# Patient Record
Sex: MALE | Race: WHITE | Employment: STUDENT | URBAN - METROPOLITAN AREA
[De-identification: names, ages, dates, MRNs, and addresses within clinical notes are randomized per-mention and may not be internally consistent; named-entity substitution may affect disease eponyms.]

---

## 2017-07-19 ENCOUNTER — APPOINTMENT (OUTPATIENT)
Dept: LAB | Age: 22
End: 2017-07-19
Attending: PHYSICIAN ASSISTANT
Payer: COMMERCIAL

## 2017-07-19 DIAGNOSIS — Z72.51 UNPROTECTED SEXUAL INTERCOURSE: ICD-10-CM

## 2017-07-19 LAB — T PALLIDUM AB SER QL IA: NONREACTIVE

## 2017-07-19 PROCEDURE — 87591 N.GONORRHOEAE DNA AMP PROB: CPT

## 2017-07-19 PROCEDURE — 36415 COLL VENOUS BLD VENIPUNCTURE: CPT

## 2017-07-19 PROCEDURE — 86780 TREPONEMA PALLIDUM: CPT

## 2017-07-19 PROCEDURE — 87491 CHLMYD TRACH DNA AMP PROBE: CPT

## 2017-07-19 PROCEDURE — 87389 HIV-1 AG W/HIV-1&-2 AB AG IA: CPT

## 2017-07-19 NOTE — PROGRESS NOTES
Pili Rae is a 24year old male. HPI:   Dawood Vences presents for ADD/ADHD. He states his medication is controlling his concentration well. No insomnia, weight loss, tics, or aggression.  He has been exposed to strep throat via his GF; he has no symptoms, bu Skin: No visible rashes  Psych: Normal mood, affect, memory, judgement, and thought process    ASSESSMENT AND PLAN:   Attention deficit hyperactivity disorder (adhd), predominantly inattentive type  (primary encounter diagnosis)  Encounter for medication m · Lack of discipline   What is ADHD? Attention deficit hyperactivity disorder makes it hard to focus your mind. You may daydream a lot. And you may be restless much of the time. As a result, you may have trouble with detailed or boring work.  And it may be

## 2017-07-20 LAB
C TRACH DNA SPEC QL NAA+PROBE: NEGATIVE
N GONORRHOEA DNA SPEC QL NAA+PROBE: NEGATIVE

## 2017-10-30 PROBLEM — F41.9 ANXIETY: Status: ACTIVE | Noted: 2017-10-30

## 2017-10-30 PROBLEM — G47.00 INSOMNIA: Status: ACTIVE | Noted: 2017-10-30

## 2017-10-30 NOTE — PATIENT INSTRUCTIONS
Insomnia  Insomnia is repeated difficulty going to sleep or staying asleep, or both. Whether you have insomnia is not defined by a specific amount of sleep.  Different people need different amounts of sleep, and you may need more or less sleep at [place] [id number] Many different medidcines can affect your sleep, such as stimulants, caffeine, alcohol, some decongestants, and diet pills.  Other medicines may include some types of blood pressure pills, steroids, asthma medicines, antihistamines, antidepressants, seizure · Get regular exercise. Find other ways to lessen your stress level. · If a medicine was prescribed to help reset your sleep patterns, take it as directed. Sleeping pills are intended for short-term use, only.  If taken for too long, the effect wears off w Almost everyone gets nervous now and then. It’s normal to have knots in your stomach before a test, or for your heart to race on a first date. But an anxiety disorder is much more than a case of nerves. In fact, its symptoms may be overwhelming.  But treatm You may believe that nothing can help you. Or, you might fear what others may think. But most anxiety symptoms can be eased. Having an anxiety disorder is nothing to be ashamed of. Most people do best with treatment that combines medicine and therapy.  Thes

## 2017-10-30 NOTE — PROGRESS NOTES
Pablo Melvin is a 24year old male. HPI:   Frances Parada presents for anxiety and insomnia. The anxiety started a few months ago due to the pressure of school, and it is causing him to not sleep well.  He feels tired, but then will lay in bed and cannot rest his Abdomen: No distention  Extremities: No pedal edema  Neuro: A&O x 3; normal gait and balance  Skin: No visible rashes  Psych: Normal mood, affect, memory, judgement, and thought process    ASSESSMENT AND PLAN:   Anxiety  (primary encounter diagnosis)  Inso “Other insomnia” is the third type of insomnia-related sleep disorders.  This description applies to people who have problems getting to sleep or staying asleep, but do not meet all of the factors that describe either short-term or chronic insomnia.    Man · Caffeine, smoking and alcohol also affect sleep. Limit your daily use and do not use these before bedtime. Alcohol may make you sleepy at first, but as its effects wear off, you may awaken a few hours later and have trouble returning to sleep.   · Do not · Trouble walking or talking, loss of balance, numbness or weakness in one side of your body, facial droop  When to seek medical advice  Call your healthcare provider right away if any of these occur:  · Extreme restlessness or irritability  · Confusion or · Panic disorder. This causes an intense fear of being in danger. · Phobias. These are extreme fears of certain objects, places, or events. · Obsessive-compulsive disorder. This causes you to have unwanted thoughts and urges.  You also may perform certain

## 2017-11-28 NOTE — TELEPHONE ENCOUNTER
LOV 10/30/17  Received refill request from [facility] for Sertraline 50mg. Kevin Roman has an appointment with Kimmie Beltran tomorrow 11/29/17 at 2:00p.m. This medication is not on protocol. Will you refill this medication?   Routed to Kimmie Beltran

## 2017-11-29 NOTE — PROGRESS NOTES
Filomena Del Rio is a 24year old male. HPI:   Orland Ormond presents for anxiety. He states his medication is controlling his anxiety well without significant SEs. No panic attacks. He is also here to discuss his insomnia.  He is sleeping a little better with the s balance  Skin: No visible rashes  Psych: Normal mood, affect, memory, judgement, and thought process    ASSESSMENT AND PLAN:   Anxiety  (primary encounter diagnosis)  Primary insomnia  I changed him from trazodone to low-dose Xanax at night, only on nights

## 2018-01-18 NOTE — PROGRESS NOTES
Latesha Burgos is a 25year old male. HPI:   Merry Clemens presents for ADD/ADHD. He states his medication is controlling his concentration well. No insomnia, weight loss, tics, or aggression.      He would like to increase his sertraline from 50 mg, as he does no PLAN:   Anxiety  (primary encounter diagnosis)  Primary insomnia  Attention deficit disorder (add) without hyperactivity  Encounter for medication management  I discussed the increase in sertraline; he did not want to go to 100 mg, so he elected to combine

## 2018-03-12 NOTE — PROGRESS NOTES
CC:  Patient presents with:  Medication Follow-Up      HPI: Sarahy Vu presents for an increase in his ADD medication. He does not feel it has been effective at the current dose, and would like to increase it.  He is happy with the sertraline at 75 mg, but his pha Plan:    (F41.9) Anxiety  (primary encounter diagnosis)  Plan: I refilled 90 days of both sertraline tablets, to be taken together.    (F98.8) Attention deficit disorder (ADD) without hyperactivity  Plan: I will increase his Adderall to 20 mg BID once he b

## 2018-04-19 NOTE — PROGRESS NOTES
Anish Liz is a 25year old male. HPI:   Adama Quinn presents for ADD/ADHD-related issues. He states his medication is controlling his concentration well. No insomnia, weight loss, tics, or aggression.  He has been sleeping well with only occasional difficul process    ASSESSMENT AND PLAN:   (F90.0) Attention deficit hyperactivity disorder (ADHD), predominantly inattentive type  (primary encounter diagnosis)  Plan: Well-controlled on current medication. .    (F41.9) Anxiety  Plan: Well-controlled on current med

## 2018-06-07 ENCOUNTER — HOSPITAL ENCOUNTER (EMERGENCY)
Facility: HOSPITAL | Age: 23
Discharge: LEFT BEFORE DISCHARGE | End: 2018-06-07
Attending: EMERGENCY MEDICINE
Payer: COMMERCIAL

## 2018-06-07 VITALS
DIASTOLIC BLOOD PRESSURE: 100 MMHG | RESPIRATION RATE: 20 BRPM | HEART RATE: 76 BPM | SYSTOLIC BLOOD PRESSURE: 156 MMHG | TEMPERATURE: 98 F

## 2018-06-27 NOTE — TELEPHONE ENCOUNTER
LOV 4/19/2018 was with [facility] and at that time, pt was advised to follow up in 3 months. No future appointments.      Refill request for:      Pending Prescriptions Disp Refills    SERTRALINE HCL 25 MG Oral Tab [Pharmacy Med Name: SERTRALINE 25MG T

## 2018-06-29 RX ORDER — SERTRALINE HYDROCHLORIDE 25 MG/1
TABLET, FILM COATED ORAL
Qty: 90 TABLET | Refills: 0 | Status: SHIPPED | OUTPATIENT
Start: 2018-06-29 | End: 2018-07-20

## 2018-06-29 NOTE — TELEPHONE ENCOUNTER
30 days given. Patient declines scheduling appt at this time. He states he will call back. Pt advised appt is needed prior to further refills. He verbalized understanding.

## 2018-07-20 RX ORDER — SERTRALINE HYDROCHLORIDE 25 MG/1
TABLET, FILM COATED ORAL
Qty: 90 TABLET | Refills: 0 | Status: SHIPPED | OUTPATIENT
Start: 2018-07-20 | End: 2018-08-03

## 2018-07-20 NOTE — TELEPHONE ENCOUNTER
Patient has appt 8/3 but is almost out of Sertraline.   Mom was asking if a month could be sent to [facility] [phone]

## 2018-07-20 NOTE — TELEPHONE ENCOUNTER
Routed to Dr. Chuckie Kaplan - patient has been seeing Linda Whitmore PA-C.  Has appt with you on 8/3/2018    Medication pended for 30 day supply

## 2018-08-03 NOTE — PROGRESS NOTES
Lonny Benjamin is a 25year old male. HPI:   Patient is treated with sertraline x 6 months, started primarily for anxiety.   Feels 'lazy', loss of pleasure in things, last of focus and interest.  Also has ADD and treated with Adderall.    zoloft started

## 2018-08-08 ENCOUNTER — TELEPHONE (OUTPATIENT)
Dept: FAMILY MEDICINE CLINIC | Facility: CLINIC | Age: 23
End: 2018-08-08

## 2018-08-08 NOTE — TELEPHONE ENCOUNTER
Patient's mom called stated patient was seen 8/3 by Dr. Talita Avalos and forgot to ask for adderall refills. Patient needs script.

## 2018-08-08 NOTE — TELEPHONE ENCOUNTER
During last office visit patient stated he did not need Adderall refills but mother called stating he needs refills I will ask Dr Talita Avalos if she will order these and print hard copies to be picked up

## 2018-08-09 RX ORDER — DEXTROAMPHETAMINE SACCHARATE, AMPHETAMINE ASPARTATE, DEXTROAMPHETAMINE SULFATE AND AMPHETAMINE SULFATE 5; 5; 5; 5 MG/1; MG/1; MG/1; MG/1
20 TABLET ORAL 2 TIMES DAILY
Qty: 60 TABLET | Refills: 0 | Status: SHIPPED | OUTPATIENT
Start: 2018-08-09 | End: 2018-09-08 | Stop reason: WASHOUT

## 2018-08-09 RX ORDER — DEXTROAMPHETAMINE SACCHARATE, AMPHETAMINE ASPARTATE, DEXTROAMPHETAMINE SULFATE AND AMPHETAMINE SULFATE 5; 5; 5; 5 MG/1; MG/1; MG/1; MG/1
20 TABLET ORAL 2 TIMES DAILY
Qty: 60 TABLET | Refills: 0 | Status: SHIPPED | OUTPATIENT
Start: 2018-10-08 | End: 2018-11-07 | Stop reason: WASHOUT

## 2018-08-09 RX ORDER — DEXTROAMPHETAMINE SACCHARATE, AMPHETAMINE ASPARTATE, DEXTROAMPHETAMINE SULFATE AND AMPHETAMINE SULFATE 5; 5; 5; 5 MG/1; MG/1; MG/1; MG/1
20 TABLET ORAL 2 TIMES DAILY
Qty: 60 TABLET | Refills: 0 | Status: SHIPPED | OUTPATIENT
Start: 2018-09-08 | End: 2018-10-08 | Stop reason: WASHOUT

## 2018-09-13 ENCOUNTER — OFFICE VISIT (OUTPATIENT)
Dept: FAMILY MEDICINE CLINIC | Facility: CLINIC | Age: 23
End: 2018-09-13
Payer: COMMERCIAL

## 2018-09-13 VITALS
DIASTOLIC BLOOD PRESSURE: 88 MMHG | WEIGHT: 177 LBS | SYSTOLIC BLOOD PRESSURE: 138 MMHG | TEMPERATURE: 99 F | RESPIRATION RATE: 16 BRPM | HEIGHT: 70 IN | BODY MASS INDEX: 25.34 KG/M2 | HEART RATE: 72 BPM

## 2018-09-13 DIAGNOSIS — R00.2 PALPITATIONS: ICD-10-CM

## 2018-09-13 DIAGNOSIS — R01.1 HEART MURMUR, SYSTOLIC: ICD-10-CM

## 2018-09-13 DIAGNOSIS — Z00.00 WELL ADULT EXAM: Primary | ICD-10-CM

## 2018-09-13 PROBLEM — I44.0 AV BLOCK, 1ST DEGREE: Status: ACTIVE | Noted: 2018-09-13

## 2018-09-13 PROCEDURE — 93000 ELECTROCARDIOGRAM COMPLETE: CPT | Performed by: FAMILY MEDICINE

## 2018-09-13 PROCEDURE — 99395 PREV VISIT EST AGE 18-39: CPT | Performed by: FAMILY MEDICINE

## 2018-09-13 NOTE — PROGRESS NOTES
:HPI:   Manuel Florez is a 25year old male who presents for a complete physical exam.     Patient here for physical for track and field. At [employer]. 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  Feels good running.   No c pain  NEURO: denies headaches  PSYCHE: anxiety, ADD  HEMATOLOGIC: denies hx of anemia  ENDOCRINE: denies thyroid history  ALL/ASTHMA: denies hx of allergy or asthma    EXAM:   /88   Pulse 72   Temp 98.6 °F (37 °C) (Oral)   Resp 16   Ht 70\"   Wt 177 Hoang       Thyroid  (most recent labs)   No results found for: T4F      Lipids  (most recent labs)   No results found for: A1C       ASSESSMENT:     Well adult exam  Systolic Murmur  History of 1st degree AV block    PLAN:     Mandy Gaming was seen today for spo

## 2018-11-06 NOTE — TELEPHONE ENCOUNTER
Refill request sent from pharmacy for Escitalopram. LOV 09/13/18. Will you approve refill? Routed to  Evan Barajas.

## 2018-11-07 NOTE — TELEPHONE ENCOUNTER
Dr. Doroteo Catalan had recently started him on Lexapro. Please see how he is doing. I just saw him for sports physical and had not been on for very long. Also, I ordered echo and needs to have this done.

## 2018-11-08 NOTE — TELEPHONE ENCOUNTER
Mom states he is doing well on Lexapro. She will have patient call to schedule echo.     Routed to [employer], PAAnnieC - please refill Lexapro to [facility]

## 2018-11-09 RX ORDER — ESCITALOPRAM OXALATE 10 MG/1
TABLET ORAL
Qty: 30 TABLET | Refills: 0 | Status: SHIPPED | OUTPATIENT
Start: 2018-11-09 | End: 2018-12-03

## 2018-12-03 NOTE — PROGRESS NOTES
Pablo Ngozi is a 25year old male. S:  Patient presents today with the following concerns:  · Taking 10 mg of escitalopram.  Doesn't know if helping as well as sertraline. Off school right now so not concerned about this as has very little stress.   John Mayes dysuria  MUSCULOSKELETAL: denies back pain  NEURO: denies headaches    EXAM:  /80   Pulse 80   Resp 14   Ht 69.5\"   Wt 183 lb   BMI 26.64 kg/m²   GENERAL: well developed, well nourished,in no apparent distress.   Mood, affect, and behavior are normal

## 2018-12-13 ENCOUNTER — HOSPITAL ENCOUNTER (OUTPATIENT)
Dept: CV DIAGNOSTICS | Facility: HOSPITAL | Age: 23
Discharge: HOME | End: 2018-12-13
Attending: FAMILY MEDICINE
Payer: COMMERCIAL

## 2018-12-13 DIAGNOSIS — R01.1 HEART MURMUR, SYSTOLIC: ICD-10-CM

## 2018-12-13 PROCEDURE — 93306 TTE W/DOPPLER COMPLETE: CPT | Performed by: FAMILY MEDICINE

## 2019-01-02 NOTE — PROGRESS NOTES
DSM-IV Diagnostic Criteria for Attention-Deficit/Hyperactivity Disorder   A. Either 1 or 2:              []  1.  Six (or more) of the following symptoms of inattention have persisted for at least six months to a degree that is maladaptive and inconsistent w activities quietly          []  e. Is often “on the go” or often acts as if “driven by a motor”          []  f. Often talks excessively          []  Impulsivity          []  g. Often blurts out answers before questions have been completed          [x]  h.

## 2019-01-02 NOTE — PROGRESS NOTES
Latesha Burgos is a 21year old male. Patient presents with:  Sports Physical       Subjective    HPI:   Patient presents for recheck of his ADHD. Patient has been using the stimulant medication, Adderall 20 mg twice daily on a regular basis.  Patient was l Objective    EXAM:   /70   Pulse 60   Temp 97.7 °F (36.5 °C) (Oral)   Resp 12   Ht 70\"   Wt 180 lb   BMI 25.83 kg/m²   GENERAL: well developed, well nourished,in no apparent distress  LUNGS: clear to auscultation  CARDIO: RRR without murmur  PSYCH:

## 2019-01-22 NOTE — TELEPHONE ENCOUNTER
LOV 1/2/2019     No future appointments.      Refill request for:    Requested Prescriptions     Pending Prescriptions Disp Refills   • ESCITALOPRAM 10 MG Oral Tab [Pharmacy Med Name: ESCITALOPRAM 10MG TABLETS] 90 tablet 0     Sig: TAKE 1 TABLET(10 MG) BY M

## 2019-03-17 DIAGNOSIS — F41.9 ANXIETY: ICD-10-CM

## 2019-03-25 NOTE — TELEPHONE ENCOUNTER
Last refilled on 1/23/19 for #90 no refills, no future appt schedule at this time  Pt was asked to F/U in 3 months

## 2019-03-25 NOTE — TELEPHONE ENCOUNTER
Please call patient and assist with scheduling a F/U ADHD and anxiety appt with Dr Greg Monroy or one of the [facility] [facility] providers.    Ask if he has enough anxiety med until appt

## 2019-03-25 NOTE — TELEPHONE ENCOUNTER
Patient is scheduled for tomorrow with Dr. Js Kebede to go over adhd and med refills. Patient is out of medication because he is currently taking 2 tablets of 10MG.  Patient will discuss this tomorrow

## 2019-03-25 NOTE — TELEPHONE ENCOUNTER
LMOM asking patient to contact our office to schedule ADHD, confirm what dosage he is taking between 10 mg or 20 mg. Ask if he has enough medication to last him till appointment scheduled.

## 2019-03-26 NOTE — TELEPHONE ENCOUNTER
Future Appointments   Date Time Provider Timmy Ernandez   3/26/2019  2:45 PM Rachel Perea, DO [facility]     Please address refill at today's appt.     Routed to Dr Pablito Platt

## 2019-03-29 RX ORDER — ESCITALOPRAM OXALATE 10 MG/1
TABLET ORAL
Qty: 90 TABLET | Refills: 0 | OUTPATIENT
Start: 2019-03-29

## 2019-03-29 NOTE — PROGRESS NOTES
CC: Anxiety. ADHD follow up     Dulce Baptiste is a 21year old male who presents for follow up anxiety and ADHD. Patient has been on Lexapro 20 mg for the last 2 months.   He notes that he had increased this but did not follow-u Temp: 98.2 °F (36.8 °C)       PHYSICAL EXAM  GENERAL:  Alert and in no acute distress. Well groomed and appropriately dressed. CARDIOVASCULAR: Regular rate and rhythm. PULMONOLOGY: Normal effort on room air. Clear to auscultation bilaterally.   PSYCH:

## 2019-04-01 ENCOUNTER — TELEPHONE (OUTPATIENT)
Dept: FAMILY MEDICINE CLINIC | Facility: CLINIC | Age: 24
End: 2019-04-01

## 2019-04-01 NOTE — TELEPHONE ENCOUNTER
Received fax from [facility] for refill of Escitalopram 10 mg tablets. This refill was taken care of by [facility] 3/28/19.

## 2019-05-26 NOTE — PROGRESS NOTES
Patient presents with:  ADHD: F/U on ADHD and refill Adderall and Lexparo      HISTORY OF PRESENT ILLNESS  Latesha Burgos is a 21year old year old male who presents to clinic for ADD follow up. He has been on Adderall 20 mg twice daily for some time.  He ha Use      Smoking status: Never Smoker      Smokeless tobacco: Never Used    Substance and Sexual Activity      Alcohol use: Yes        Alcohol/week: 0.0 oz        Comment: 3 a week      Drug use: No    Other Topics      Concerns:        Caffeine Concern:  Kelsi Baer

## 2019-05-30 ENCOUNTER — HOSPITAL ENCOUNTER (EMERGENCY)
Facility: HOSPITAL | Age: 24
Discharge: HOME | End: 2019-05-30
Attending: STUDENT IN AN ORGANIZED HEALTH CARE EDUCATION/TRAINING PROGRAM
Payer: COMMERCIAL

## 2019-05-30 VITALS
RESPIRATION RATE: 18 BRPM | OXYGEN SATURATION: 100 % | TEMPERATURE: 97 F | BODY MASS INDEX: 26.05 KG/M2 | DIASTOLIC BLOOD PRESSURE: 98 MMHG | HEIGHT: 70 IN | SYSTOLIC BLOOD PRESSURE: 154 MMHG | WEIGHT: 182 LBS | HEART RATE: 95 BPM

## 2019-05-30 DIAGNOSIS — K04.7 DENTAL INFECTION: Primary | ICD-10-CM

## 2019-05-30 PROCEDURE — 99283 EMERGENCY DEPT VISIT LOW MDM: CPT

## 2019-05-30 RX ORDER — CLINDAMYCIN HYDROCHLORIDE 300 MG/1
300 CAPSULE ORAL 4 TIMES DAILY
Qty: 40 CAPSULE | Refills: 0 | Status: SHIPPED | OUTPATIENT
Start: 2019-05-30 | End: 2019-06-09

## 2019-05-30 NOTE — ED INITIAL ASSESSMENT (HPI)
Pt presents to ed via ems, ambulatory with steady gait c/o a oral abscess that \"popped a couple hours ago\". Pt denies pain, is a&ox4, moves all extremities well, resps easy.

## 2019-05-30 NOTE — ED PROVIDER NOTES
Patient Seen in: [facility] Emergency Department    History   Patient presents with:  Dental Problem (dental)    Stated Complaint: dental issue    HPI    Patient is a 24-year-old male who presents the emergency department complaining of swelling ellis dental fracture appreciated in the second inferior left molar with associated gum edema and erythema and tenderness. No other dental tenderness appreciated. No drainable abscess visualized.   Neck: Very mild left anterior cervical lymphadenopathy is prese

## 2019-06-26 ENCOUNTER — OFFICE VISIT (OUTPATIENT)
Dept: FAMILY MEDICINE CLINIC | Facility: CLINIC | Age: 24
End: 2019-06-26
Payer: COMMERCIAL

## 2019-06-26 VITALS
RESPIRATION RATE: 18 BRPM | SYSTOLIC BLOOD PRESSURE: 132 MMHG | HEIGHT: 69.5 IN | DIASTOLIC BLOOD PRESSURE: 88 MMHG | TEMPERATURE: 98 F | HEART RATE: 76 BPM | WEIGHT: 176.63 LBS | BODY MASS INDEX: 25.57 KG/M2

## 2019-06-26 DIAGNOSIS — F90.2 ATTENTION DEFICIT HYPERACTIVITY DISORDER (ADHD), COMBINED TYPE: ICD-10-CM

## 2019-06-26 DIAGNOSIS — Z79.899 ENCOUNTER FOR MEDICATION MANAGEMENT: Primary | ICD-10-CM

## 2019-06-26 PROCEDURE — 99213 OFFICE O/P EST LOW 20 MIN: CPT | Performed by: PHYSICIAN ASSISTANT

## 2019-06-26 RX ORDER — DEXTROAMPHETAMINE SACCHARATE, AMPHETAMINE ASPARTATE, DEXTROAMPHETAMINE SULFATE AND AMPHETAMINE SULFATE 5; 5; 5; 5 MG/1; MG/1; MG/1; MG/1
20 TABLET ORAL 2 TIMES DAILY
Qty: 60 TABLET | Refills: 0 | Status: SHIPPED | OUTPATIENT
Start: 2019-07-26 | End: 2019-08-25 | Stop reason: WASHOUT

## 2019-06-26 RX ORDER — DEXTROAMPHETAMINE SACCHARATE, AMPHETAMINE ASPARTATE, DEXTROAMPHETAMINE SULFATE AND AMPHETAMINE SULFATE 5; 5; 5; 5 MG/1; MG/1; MG/1; MG/1
20 TABLET ORAL 2 TIMES DAILY
Qty: 60 TABLET | Refills: 0 | Status: SHIPPED | OUTPATIENT
Start: 2019-08-25 | End: 2019-09-23

## 2019-06-26 RX ORDER — DEXTROAMPHETAMINE SACCHARATE, AMPHETAMINE ASPARTATE, DEXTROAMPHETAMINE SULFATE AND AMPHETAMINE SULFATE 5; 5; 5; 5 MG/1; MG/1; MG/1; MG/1
20 TABLET ORAL 2 TIMES DAILY
Qty: 60 TABLET | Refills: 0 | Status: SHIPPED | OUTPATIENT
Start: 2019-06-26 | End: 2019-07-26 | Stop reason: WASHOUT

## 2019-06-26 NOTE — PROGRESS NOTES
Patient presents with:  ADHD: needs new prescriptions      HISTORY OF PRESENT ILLNESS  Manuel Vikki is a 21year old year old male who presents to clinic for ADD follow up. Lost his prescriptions in a move after last visit.  He has been doing well on his m Concern: Yes          1x daily        Exercise: Yes          6 times a week        Seat Belt: Yes      Wt Readings from Last 6 Encounters:  06/26/19 : 176 lb 9.6 oz  05/30/19 : 182 lb  05/24/19 : 182 lb  03/28/19 : 178 lb  01/02/19 : 180 lb  12/03/18 : 183

## 2019-09-23 NOTE — PROGRESS NOTES
Patient presents with:  ADHD: 3 month Adderall f/u  Anxiety: Escitalopram refill  Imm/Inj: Would like flu vaccine      HPI:  Presents for follow up of ADHD and anxiety. Stated taking medications as ordered w/o side effects (Adderall and escitalopram).  Feel tablets (15 mg total) by mouth daily.  Disp: 135 tablet Rfl: 1       Physical Exam  /88   Pulse 76   Temp 98.5 °F (36.9 °C) (Oral)   Resp 16   Ht 69.5\"   Wt 175 lb 9.6 oz   BMI 25.56 kg/m²   Constitutional: well developed, well nourished,in no appare check.  There are no Patient Instructions on file for this visit. All questions were answered and the patient understands the plan.

## 2019-11-29 NOTE — TELEPHONE ENCOUNTER
LOV 9/23/2019     Patient was asked to follow-up in: 3 months    Appointment scheduled: Visit date not found     Refill request for:    Requested Prescriptions     Pending Prescriptions Disp Refills   • ESCITALOPRAM 10 MG Oral Tab [Pharmacy Med Name: ESCIT

## 2019-12-02 RX ORDER — ESCITALOPRAM OXALATE 10 MG/1
TABLET ORAL
Qty: 135 TABLET | Refills: 0 | Status: SHIPPED | OUTPATIENT
Start: 2019-12-02 | End: 2020-01-02

## 2020-01-02 NOTE — PROGRESS NOTES
Patient presents with:  ADD: Medication refill      HPI:  Presents for follow up of ADHD and anxiety. Stated taking medications as ordered w/o side effects (reports taking one full 30mg Adderall BID). Feels it is helping.  Currently student at [employer] (was prev Physical Exam  /80   Pulse 74   Temp 98.2 °F (36.8 °C) (Oral)   Resp 16   Ht 70.08\"   Wt 187 lb 6 oz (85 kg)   BMI 26.82 kg/m²   Constitutional: well developed, well nourished,in no apparent distress  HEENT: Normocephalic and atraumatic.    Eye

## 2020-03-03 ENCOUNTER — TELEPHONE (OUTPATIENT)
Dept: FAMILY MEDICINE CLINIC | Facility: CLINIC | Age: 25
End: 2020-03-03

## 2020-03-03 RX ORDER — DEXTROAMPHETAMINE SACCHARATE, AMPHETAMINE ASPARTATE, DEXTROAMPHETAMINE SULFATE AND AMPHETAMINE SULFATE 7.5; 7.5; 7.5; 7.5 MG/1; MG/1; MG/1; MG/1
30 TABLET ORAL 2 TIMES DAILY
Qty: 60 TABLET | Refills: 0 | Status: SHIPPED | OUTPATIENT
Start: 2020-03-03 | End: 2020-04-03

## 2020-03-03 NOTE — TELEPHONE ENCOUNTER
Silvio Cervantes with [employer], patient went in to fill Adderall 30mg and pharmacy is out of stock, patient requesting prescription be sent to [facility] in [address] and Silvio Cervantes with [employer] will cancel their prescription

## 2020-03-03 NOTE — TELEPHONE ENCOUNTER
Patient very angry that this request has not been addressed yet. He needs to go to class in 5 minutes and demands that the medication is refilled prior to that time.  Apologized to patient, but could not guarantee him that this would be possible as Jilda Letters Vi

## 2020-03-03 NOTE — TELEPHONE ENCOUNTER
Patient is calling to make sure the Adderall will be refilled. He is very anxious and a bit demanding.

## 2020-03-05 ENCOUNTER — TELEPHONE (OUTPATIENT)
Dept: FAMILY MEDICINE CLINIC | Facility: CLINIC | Age: 25
End: 2020-03-05

## 2020-03-05 NOTE — TELEPHONE ENCOUNTER
Patient no showed appointment 03/05/20.  Called and left message on machine explaining no show and $40 fee, asked he contact the office to reschedule

## 2020-04-03 NOTE — TELEPHONE ENCOUNTER
Due for ADHD follow up, some hair loss noted last 3 moth, also loss on forearms and stomach    Weight is stable, anxiety stable and is doing well on these medications.   He is losing this hair all over and feels somewhat fatigue and there is no good reason

## 2020-04-03 NOTE — TELEPHONE ENCOUNTER
Virtual/Telephone Check-In    Adela Sifuentes verbally consents to a [employer] on 4/3/2020. Patient understands and accepts financial responsibility for any deductible, co-insurance and/or co-pays associated with this service.     Pa

## 2020-04-03 NOTE — TELEPHONE ENCOUNTER
LOV 1/2/2020 and at that time, pt was advised to follow up in 3 months. No future appointments. .    Refill request for:    Requested Prescriptions     Pending Prescriptions Disp Refills   • escitalopram 10 MG Oral Tab 135 tablet 0     Sig: TAKE 1 AND 1/

## 2020-07-09 NOTE — PROGRESS NOTES
Virtual Telephone Check-In    Demond Azevedo verbally consents to a Virtual/Telephone Check-In visit on 07/09/20. Patient has been referred to the [facility] website at [email or web address] to review the yearly Consent to Treat document.     Patient Shade Alfonzo (ADDERALL) 30 MG Oral Tab, Take 1 tablet (30 mg total) by mouth 2 (two) times daily. , Disp: 60 tablet, Rfl: 0  •  amphetamine-dextroamphetamine (ADDERALL) 30 MG Oral Tab, Take 1 tablet (30 mg total) by mouth 2 (two) times daily. , Disp: 60 tablet, Rfl: 0 pain/ palpitations, sleep disturbances.     MELVIN Verma

## 2020-10-06 ENCOUNTER — OFFICE VISIT (OUTPATIENT)
Dept: FAMILY MEDICINE CLINIC | Facility: CLINIC | Age: 25
End: 2020-10-06
Payer: COMMERCIAL

## 2020-10-06 VITALS
DIASTOLIC BLOOD PRESSURE: 80 MMHG | WEIGHT: 184 LBS | HEIGHT: 69.5 IN | RESPIRATION RATE: 16 BRPM | BODY MASS INDEX: 26.64 KG/M2 | SYSTOLIC BLOOD PRESSURE: 128 MMHG | TEMPERATURE: 98 F | HEART RATE: 84 BPM

## 2020-10-06 DIAGNOSIS — F90.2 ATTENTION DEFICIT HYPERACTIVITY DISORDER (ADHD), COMBINED TYPE: ICD-10-CM

## 2020-10-06 DIAGNOSIS — Z01.84 IMMUNITY STATUS TESTING: ICD-10-CM

## 2020-10-06 DIAGNOSIS — Z00.00 WELL ADULT EXAM: Primary | ICD-10-CM

## 2020-10-06 PROCEDURE — 99395 PREV VISIT EST AGE 18-39: CPT | Performed by: PHYSICIAN ASSISTANT

## 2020-10-06 PROCEDURE — 3079F DIAST BP 80-89 MM HG: CPT | Performed by: PHYSICIAN ASSISTANT

## 2020-10-06 PROCEDURE — 90471 IMMUNIZATION ADMIN: CPT | Performed by: PHYSICIAN ASSISTANT

## 2020-10-06 PROCEDURE — 90715 TDAP VACCINE 7 YRS/> IM: CPT | Performed by: PHYSICIAN ASSISTANT

## 2020-10-06 PROCEDURE — 3008F BODY MASS INDEX DOCD: CPT | Performed by: PHYSICIAN ASSISTANT

## 2020-10-06 PROCEDURE — 3074F SYST BP LT 130 MM HG: CPT | Performed by: PHYSICIAN ASSISTANT

## 2020-10-06 PROCEDURE — 90472 IMMUNIZATION ADMIN EACH ADD: CPT | Performed by: PHYSICIAN ASSISTANT

## 2020-10-06 PROCEDURE — 90686 IIV4 VACC NO PRSV 0.5 ML IM: CPT | Performed by: PHYSICIAN ASSISTANT

## 2020-10-06 RX ORDER — DEXTROAMPHETAMINE SACCHARATE, AMPHETAMINE ASPARTATE, DEXTROAMPHETAMINE SULFATE AND AMPHETAMINE SULFATE 7.5; 7.5; 7.5; 7.5 MG/1; MG/1; MG/1; MG/1
30 TABLET ORAL 2 TIMES DAILY
Qty: 60 TABLET | Refills: 0 | Status: SHIPPED | OUTPATIENT
Start: 2020-12-07 | End: 2021-01-06

## 2020-10-06 RX ORDER — DEXTROAMPHETAMINE SACCHARATE, AMPHETAMINE ASPARTATE, DEXTROAMPHETAMINE SULFATE AND AMPHETAMINE SULFATE 7.5; 7.5; 7.5; 7.5 MG/1; MG/1; MG/1; MG/1
30 TABLET ORAL 2 TIMES DAILY
Qty: 60 TABLET | Refills: 0 | Status: SHIPPED | OUTPATIENT
Start: 2020-11-06 | End: 2021-01-06

## 2020-10-06 RX ORDER — DEXTROAMPHETAMINE SACCHARATE, AMPHETAMINE ASPARTATE, DEXTROAMPHETAMINE SULFATE AND AMPHETAMINE SULFATE 7.5; 7.5; 7.5; 7.5 MG/1; MG/1; MG/1; MG/1
30 TABLET ORAL 2 TIMES DAILY
Qty: 60 TABLET | Refills: 0 | Status: SHIPPED | OUTPATIENT
Start: 2020-10-06 | End: 2021-01-06

## 2020-10-22 ENCOUNTER — OFFICE VISIT (OUTPATIENT)
Dept: FAMILY MEDICINE CLINIC | Facility: CLINIC | Age: 25
End: 2020-10-22
Payer: COMMERCIAL

## 2020-10-22 VITALS
DIASTOLIC BLOOD PRESSURE: 70 MMHG | HEIGHT: 69.5 IN | RESPIRATION RATE: 17 BRPM | HEART RATE: 67 BPM | WEIGHT: 179 LBS | BODY MASS INDEX: 25.92 KG/M2 | SYSTOLIC BLOOD PRESSURE: 122 MMHG

## 2020-10-22 DIAGNOSIS — Z23 NEED FOR MMR VACCINE: ICD-10-CM

## 2020-10-22 DIAGNOSIS — R76.12 POSITIVE QUANTIFERON-TB GOLD TEST: ICD-10-CM

## 2020-10-22 DIAGNOSIS — Z23 NEED FOR HEPATITIS B VACCINATION: Primary | ICD-10-CM

## 2020-10-22 PROCEDURE — 90707 MMR VACCINE SC: CPT | Performed by: PHYSICIAN ASSISTANT

## 2020-10-22 PROCEDURE — 3078F DIAST BP <80 MM HG: CPT | Performed by: PHYSICIAN ASSISTANT

## 2020-10-22 PROCEDURE — 3008F BODY MASS INDEX DOCD: CPT | Performed by: PHYSICIAN ASSISTANT

## 2020-10-22 PROCEDURE — 3074F SYST BP LT 130 MM HG: CPT | Performed by: PHYSICIAN ASSISTANT

## 2020-10-22 PROCEDURE — 90471 IMMUNIZATION ADMIN: CPT | Performed by: PHYSICIAN ASSISTANT

## 2020-10-22 PROCEDURE — 99213 OFFICE O/P EST LOW 20 MIN: CPT | Performed by: PHYSICIAN ASSISTANT

## 2020-10-22 PROCEDURE — 90746 HEPB VACCINE 3 DOSE ADULT IM: CPT | Performed by: PHYSICIAN ASSISTANT

## 2020-10-22 PROCEDURE — 90472 IMMUNIZATION ADMIN EACH ADD: CPT | Performed by: PHYSICIAN ASSISTANT

## 2020-10-22 NOTE — PATIENT INSTRUCTIONS
Return to clinic in 1 month for Hep B #2 and MMR #2. Return to clinic in 4 months for Hep B #3. Go to [facility] to have repeat Quantiferon TB testing. I will let you know once I see results. If positive, need to go for chest x-ray.

## 2020-10-25 NOTE — PROGRESS NOTES
Patient presents with:  Immunization/Injection: pt states he needs MMR, hep b        HISTORY OF PRESENT ILLNESS  Pili Rae is a 25year old male who presents for follow up lab results. Saw him for preemployement exam on 10/6.  At that time, labs came ba drinks a week    Drug use: No      REVIEW OF SYSTEMS  ROS     10/22/20  1427   BP: 122/70   Pulse: 67   Resp: 17       PHYSICAL EXAM  GENERAL:  Alert and in no acute distress. Well groomed and appropriately dressed.   CARDIOVASCULAR: Regular rate and rhyth

## 2020-11-11 ENCOUNTER — OFFICE VISIT (OUTPATIENT)
Dept: OCCUPATIONAL MEDICINE | Age: 25
End: 2020-11-11
Attending: FAMILY MEDICINE
Payer: COMMERCIAL

## 2020-11-11 ENCOUNTER — HOSPITAL ENCOUNTER (OUTPATIENT)
Dept: GENERAL RADIOLOGY | Age: 25
Discharge: HOME | End: 2020-11-11
Attending: PHYSICIAN ASSISTANT
Payer: COMMERCIAL

## 2020-11-11 DIAGNOSIS — R76.12 POSITIVE QUANTIFERON-TB GOLD TEST: ICD-10-CM

## 2020-11-11 PROCEDURE — 71046 X-RAY EXAM CHEST 2 VIEWS: CPT | Performed by: PHYSICIAN ASSISTANT

## 2020-11-24 ENCOUNTER — OFFICE VISIT (OUTPATIENT)
Dept: FAMILY MEDICINE CLINIC | Facility: CLINIC | Age: 25
End: 2020-11-24
Payer: COMMERCIAL

## 2020-11-24 VITALS
TEMPERATURE: 96 F | SYSTOLIC BLOOD PRESSURE: 150 MMHG | BODY MASS INDEX: 25.36 KG/M2 | WEIGHT: 171.25 LBS | HEIGHT: 69 IN | RESPIRATION RATE: 16 BRPM | DIASTOLIC BLOOD PRESSURE: 90 MMHG | HEART RATE: 102 BPM

## 2020-11-24 DIAGNOSIS — Z79.899 MEDICATION MANAGEMENT: Primary | ICD-10-CM

## 2020-11-24 DIAGNOSIS — Z23 NEED FOR HEPATITIS B VACCINATION: ICD-10-CM

## 2020-11-24 DIAGNOSIS — Z78.9 MEASLES, MUMPS, RUBELLA (MMR) VACCINATION STATUS UNKNOWN: ICD-10-CM

## 2020-11-24 DIAGNOSIS — Z22.7 LATENT TUBERCULOSIS: ICD-10-CM

## 2020-11-24 PROCEDURE — 90471 IMMUNIZATION ADMIN: CPT | Performed by: PHYSICIAN ASSISTANT

## 2020-11-24 PROCEDURE — 90472 IMMUNIZATION ADMIN EACH ADD: CPT | Performed by: PHYSICIAN ASSISTANT

## 2020-11-24 PROCEDURE — 99072 ADDL SUPL MATRL&STAF TM PHE: CPT | Performed by: PHYSICIAN ASSISTANT

## 2020-11-24 PROCEDURE — 3008F BODY MASS INDEX DOCD: CPT | Performed by: PHYSICIAN ASSISTANT

## 2020-11-24 PROCEDURE — 90707 MMR VACCINE SC: CPT | Performed by: PHYSICIAN ASSISTANT

## 2020-11-24 PROCEDURE — 90746 HEPB VACCINE 3 DOSE ADULT IM: CPT | Performed by: PHYSICIAN ASSISTANT

## 2020-11-24 PROCEDURE — 3080F DIAST BP >= 90 MM HG: CPT | Performed by: PHYSICIAN ASSISTANT

## 2020-11-24 PROCEDURE — 99213 OFFICE O/P EST LOW 20 MIN: CPT | Performed by: PHYSICIAN ASSISTANT

## 2020-11-24 PROCEDURE — 3077F SYST BP >= 140 MM HG: CPT | Performed by: PHYSICIAN ASSISTANT

## 2020-11-24 NOTE — PATIENT INSTRUCTIONS
Need to plan to recheck your liver function every month that you are on the antibiotic. Plan to recheck MMR titer at the end of next month (may take even 6-8 weeks to see the titer change).

## 2020-11-26 NOTE — PROGRESS NOTES
Patient presents with:  Immunization/Injection: hep B MMR   Follow - Up: latent tb treatment        HISTORY OF PRESENT Daryle Powers is a 25year old male who presents for follow up immunity testing.  - Here today for MMR #2 and Hep B #2 repeat va BP: 150/90   Pulse: 102   Resp: 16   Temp: (!) 96.4 °F (35.8 °C)       PHYSICAL EXAM  GENERAL:  Alert and in no acute distress. Well groomed and appropriately dressed. Discussion based visit.     Labs:   No visits with results within 1 Week(s) from this

## 2021-01-08 NOTE — PROGRESS NOTES
Patient presents with:  Medication Follow-Up: is here to adderall and revampin   Immunization/Injection: may need to do hep b       Gudelia Older is a 22year old year old male who presents to clinic for ADD follow up.  He has be Years of education: Not on file      Highest education level: Not on file    Tobacco Use      Smoking status: Never Smoker      Smokeless tobacco: Never Used    Substance and Sexual Activity      Alcohol use:  Yes        Alcohol/week: 0.0 standard drinks plan.  Hussain Collins

## 2021-01-24 NOTE — PROGRESS NOTES
DATE OF EXAM  10/6/2020    CHIEF COMPLAINT/REASON FOR VISIT  Annual exam.    HISTORY OF PRESENT ILLNESS  Scarlet Rivera presents today for routine annual physical examination. 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 Feels well. Caffeine Concern: Yes          1x daily        Exercise: Yes          2 times a week        Seat Belt: Yes      Family History   Problem Relation Age of Onset   • Alcohol and Other Disorders Associated Mother         recovered alcohol   • Breast Cancer 2nd tympanic membranes normal. Moist mucous membranes. Posterior oropharynx pink without exudate or inflammation. NECK: Supple without lymphadenopathy. RESPIRATORY: Normal effort on room air. Clear to auscultation bilaterally.   CARDIOVASCULAR: Regular rat [FreeTextEntry1] : Pt aware pending results of pap/TVS any further tx.  Pt will monitor post coital bleeding,  if  continues will RTO for additional evaluation, besides pap/cultures/TVS. All the pt's questions and concerns were addressed.

## 2021-02-06 ENCOUNTER — MOBILE ENCOUNTER (OUTPATIENT)
Dept: FAMILY MEDICINE CLINIC | Facility: CLINIC | Age: 26
End: 2021-02-06

## 2021-02-10 DIAGNOSIS — F41.9 ANXIETY: ICD-10-CM

## 2021-02-10 RX ORDER — ESCITALOPRAM OXALATE 10 MG/1
TABLET ORAL
Qty: 135 TABLET | Refills: 1 | Status: SHIPPED | OUTPATIENT
Start: 2021-02-10 | End: 2021-07-08

## 2021-02-10 NOTE — TELEPHONE ENCOUNTER
Refill request for:    Requested Prescriptions     Pending Prescriptions Disp Refills   • escitalopram 10 MG Oral Tab 135 tablet 1     Sig: TAKE 1 AND 1/2 TABLETS(15 MG) BY MOUTH DAILY        Last Prescribed Quantity Refills   7/9/2020 135 1     LOV 1/6/20

## 2021-04-07 NOTE — PROGRESS NOTES
Patient presents with:  Medication Request: adderall, thinking about wanting to get off of adderall or wanting to lower the dosage, when taking the medication emotions are a bit selby like a bit depressed       [address] is Socioeconomic History      Marital status: Single      Spouse name: Not on file      Number of children: Not on file      Years of education: Not on file      Highest education level: Not on file    Tobacco Use      Smoking status: Never Smoker      Smokel 20      Patient expresses understanding and agreement with above plan.   Ana Rosa Levine

## 2021-07-12 NOTE — PROGRESS NOTES
Patient presents with:  ADHD: 3 month f/u Adderall  Anxiety: Taking escitalopram.Would like to discuss discontinuing both medications.  Doesn't like SE      HISTORY OF PRESENT ILLNESS  Filomena Del Rio is a 22year old year old male who presents to clinic for Smoker      Smokeless tobacco: Never Used      Tobacco comment: 4 cigarettes daily    Vaping Use      Vaping Use: Some days    Substance and Sexual Activity      Alcohol use:  Yes        Alcohol/week: 0.0 standard drinks        Comment: 2 drinks a week

## 2021-08-10 ENCOUNTER — TELEPHONE (OUTPATIENT)
Dept: FAMILY MEDICINE CLINIC | Facility: CLINIC | Age: 26
End: 2021-08-10

## 2021-08-10 NOTE — TELEPHONE ENCOUNTER
LM for patient to reschedule and that due to to fact he is a no show there will be a $40 no show fee.

## 2021-09-01 NOTE — PROGRESS NOTES
Telemedicine Visit     Virtual Video Check-In    Steven Sprague verbally consents to Video Check-In service on 8/31/21.  Steven Sprague understands and accepts financial responsibility for any deductible, co-insurance and/or co-pays associated with this servi Content: Thought content normal.         Judgment: Judgment normal.            Summary of topics discussed/ diagnosis:  1. Attention deficit hyperactivity disorder (ADHD), combined type  Will change rx to adderall ER 30. Refilled x 3 months.  Follow up at t

## 2021-12-02 ENCOUNTER — TELEPHONE (OUTPATIENT)
Dept: FAMILY MEDICINE CLINIC | Facility: CLINIC | Age: 26
End: 2021-12-02

## 2021-12-02 NOTE — TELEPHONE ENCOUNTER
LM asking pt to contact office regarding his appointment scheduled 12/3 with Dr. José Miguel Delvalle  RTE is showing pt has active insurance with [facility] listed as Dr. Leeroy Elmore. Pt needs to contact insurance to change pcp in order to keep appointmen.

## 2021-12-03 NOTE — PROGRESS NOTES
Arianna Chong is a 22year old male coming in for had concerns including Medication Request (adderall ). Subjective:   ADHD  This is a chronic problem. The problem occurs constantly. The problem has been unchanged.  Pertinent negatives include no abdominal pain, v amphetamine-dextroamphetamine, and escitalopram.     Return in about 3 months (around 3/3/2022) for recheck.     Jessica Sutton MD, 12/3/2021, 11:40 AM

## 2022-01-05 ENCOUNTER — HOSPITAL ENCOUNTER (EMERGENCY)
Facility: HOSPITAL | Age: 27
Discharge: LEFT BEFORE DISCHARGE | End: 2022-01-05

## 2022-01-05 NOTE — ED INITIAL ASSESSMENT (HPI)
26YM c/c headache Pt state that he having headache and started vomiting today Pt state that his partner was check his bp it was in the  190's

## 2022-03-07 NOTE — PROGRESS NOTES
Macy Munoz is a 32year old male coming in for had concerns including Medication Request (adderall ER). Subjective:   HPI doing well overall. No new issues. ROS: GENERAL HEALTH: feels well otherwise  This visit is conducted using Telemedicine with live, interactive video and audio. Patient has been referred to the [facility] website at [email or web address] to review the yearly Consent to Treat document. Patient understands and accepts financial responsibility for any deductible, co-insurance and/or co-pays associated with this service. Objective: There were no vitals taken for this visit. There is no height or weight on file to calculate BMI. Physical Exam  Constitutional:       Appearance: He is well-developed. HENT:      Head: Normocephalic and atraumatic. Pulmonary:      Effort: Pulmonary effort is normal. No respiratory distress. Musculoskeletal:         General: Normal range of motion. Cervical back: Normal range of motion. Skin:     Findings: No rash. Neurological:      Mental Status: He is alert and oriented to person, place, and time. Psychiatric:         Mood and Affect: Mood normal.         Behavior: Behavior normal.         Thought Content: Thought content normal.         Judgment: Judgment normal.           Assessment & Plan:   1. 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 He has had blood work done at [facility] and will try and get us copies of that in the near future. I am having Colton Feliciano maintain his amphetamine-dextroamphetamine, amphetamine-dextroamphetamine, amphetamine-dextroamphetamine, and escitalopram.     No follow-ups on file.     Ion Meredith MD, 3/7/2022, 12:16 PM

## 2022-03-09 ENCOUNTER — TELEPHONE (OUTPATIENT)
Dept: FAMILY MEDICINE CLINIC | Facility: CLINIC | Age: 27
End: 2022-03-09

## 2022-03-09 NOTE — TELEPHONE ENCOUNTER
LM for patient to provide his updated insurance information for a past appt telemedicine with Dr. Chelsie Obando on 3/7/22. No insurance is on file.

## 2022-03-14 NOTE — TELEPHONE ENCOUNTER
LM for patient to provide his updated insurance information for a past appt telemedicine with Dr. Yolette Katz on 3/7/22. No insurance is on file.

## 2022-03-21 NOTE — TELEPHONE ENCOUNTER
JAVIER for patient to update insurance for his 3/7/22 telemedicine visit with Dr. Shena Hickey. He is marked self-pay at this time.

## 2022-04-03 ENCOUNTER — APPOINTMENT (OUTPATIENT)
Dept: GENERAL RADIOLOGY | Facility: HOSPITAL | Age: 27
End: 2022-04-03
Attending: STUDENT IN AN ORGANIZED HEALTH CARE EDUCATION/TRAINING PROGRAM

## 2022-04-03 ENCOUNTER — HOSPITAL ENCOUNTER (EMERGENCY)
Facility: HOSPITAL | Age: 27
Discharge: HOME | End: 2022-04-03
Attending: STUDENT IN AN ORGANIZED HEALTH CARE EDUCATION/TRAINING PROGRAM

## 2022-04-03 VITALS
RESPIRATION RATE: 16 BRPM | TEMPERATURE: 97 F | SYSTOLIC BLOOD PRESSURE: 156 MMHG | HEART RATE: 100 BPM | OXYGEN SATURATION: 95 % | DIASTOLIC BLOOD PRESSURE: 84 MMHG

## 2022-04-03 DIAGNOSIS — S90.31XA CONTUSION OF RIGHT FOOT, INITIAL ENCOUNTER: Primary | ICD-10-CM

## 2022-04-03 PROCEDURE — 99283 EMERGENCY DEPT VISIT LOW MDM: CPT

## 2022-04-03 PROCEDURE — 73630 X-RAY EXAM OF FOOT: CPT | Performed by: STUDENT IN AN ORGANIZED HEALTH CARE EDUCATION/TRAINING PROGRAM

## 2022-04-03 RX ORDER — IBUPROFEN 600 MG/1
600 TABLET ORAL EVERY 8 HOURS PRN
Qty: 30 TABLET | Refills: 0 | Status: SHIPPED | OUTPATIENT
Start: 2022-04-03 | End: 2022-04-10

## 2022-04-03 RX ORDER — IBUPROFEN 600 MG/1
600 TABLET ORAL ONCE
Status: COMPLETED | OUTPATIENT
Start: 2022-04-03 | End: 2022-04-03

## 2022-04-03 NOTE — ED INITIAL ASSESSMENT (HPI)
Pt to ED c/o right medial foot pain after \"kicking something\" last night. Pt reports he is unable to bear weight.

## 2022-04-07 ENCOUNTER — TELEPHONE (OUTPATIENT)
Dept: FAMILY MEDICINE CLINIC | Facility: CLINIC | Age: 27
End: 2022-04-07

## 2022-04-07 RX ORDER — DEXTROAMPHETAMINE SACCHARATE, AMPHETAMINE ASPARTATE MONOHYDRATE, DEXTROAMPHETAMINE SULFATE AND AMPHETAMINE SULFATE 7.5; 7.5; 7.5; 7.5 MG/1; MG/1; MG/1; MG/1
30 CAPSULE, EXTENDED RELEASE ORAL DAILY
Qty: 30 CAPSULE | Refills: 0 | Status: SHIPPED | OUTPATIENT
Start: 2022-04-07 | End: 2022-05-07

## 2022-04-07 NOTE — TELEPHONE ENCOUNTER
Bobbi Portillo is saying  Adderall is out of stock at the [facility] [address] in [place]. He would like it sent to the [facility] in [place]. He is out of the medication.     Routed to [facility]

## 2022-04-07 NOTE — TELEPHONE ENCOUNTER
Pt calling to have Adderall called into [facility] as [facility] is currently out of stock. Pt is completely out of the medication. Pharmacy has been updated in chart.

## 2023-05-09 ENCOUNTER — OFFICE VISIT (OUTPATIENT)
Dept: FAMILY MEDICINE CLINIC | Facility: CLINIC | Age: 28
End: 2023-05-09
Payer: COMMERCIAL

## 2023-05-09 VITALS
DIASTOLIC BLOOD PRESSURE: 70 MMHG | HEART RATE: 60 BPM | SYSTOLIC BLOOD PRESSURE: 112 MMHG | HEIGHT: 69.5 IN | BODY MASS INDEX: 24.61 KG/M2 | TEMPERATURE: 97 F | RESPIRATION RATE: 16 BRPM | WEIGHT: 170 LBS | OXYGEN SATURATION: 99 %

## 2023-05-09 DIAGNOSIS — Z11.3 SCREEN FOR STD (SEXUALLY TRANSMITTED DISEASE): Primary | ICD-10-CM

## 2023-05-09 DIAGNOSIS — R76.12 POSITIVE QUANTIFERON-TB GOLD TEST: ICD-10-CM

## 2023-05-09 PROCEDURE — 3074F SYST BP LT 130 MM HG: CPT | Performed by: PHYSICIAN ASSISTANT

## 2023-05-09 PROCEDURE — 3008F BODY MASS INDEX DOCD: CPT | Performed by: PHYSICIAN ASSISTANT

## 2023-05-09 PROCEDURE — 3078F DIAST BP <80 MM HG: CPT | Performed by: PHYSICIAN ASSISTANT

## 2023-05-09 PROCEDURE — 99213 OFFICE O/P EST LOW 20 MIN: CPT | Performed by: PHYSICIAN ASSISTANT

## 2023-05-12 LAB
CHLAMYDIA TRACHOMATIS$RNA, TMA: NOT DETECTED
NEISSERIA GONORRHOEAE$RNA, TMA: NOT DETECTED

## 2023-09-05 DIAGNOSIS — F90.2 ATTENTION DEFICIT HYPERACTIVITY DISORDER (ADHD), COMBINED TYPE: ICD-10-CM

## 2023-09-06 RX ORDER — TRAZODONE HYDROCHLORIDE 50 MG/1
50 TABLET ORAL NIGHTLY PRN
Qty: 30 TABLET | Refills: 0 | Status: SHIPPED | OUTPATIENT
Start: 2023-09-06

## 2023-09-06 NOTE — TELEPHONE ENCOUNTER
Refill request for:    Requested Prescriptions     Pending Prescriptions Disp Refills    TRAZODONE 50 MG Oral Tab [Pharmacy Med Name: traZODone HCl Oral Tablet 50 MG] 30 tablet 0     Sig: TAKE 1 TABLET BY MOUTH EVERY DAY AT NIGHT AS NEEDED FOR SLEEP        Last Prescribed Quantity Refills   8/2/2023 30 0     LOV 8/2/2023     Patient was asked to follow-up in: 3 months    Appointment due: November 2023    Appointment scheduled: Visit date not found    Medication not on protocol.      Routed to Javier Falcon, [facility] for review

## 2023-10-11 DIAGNOSIS — F90.2 ATTENTION DEFICIT HYPERACTIVITY DISORDER (ADHD), COMBINED TYPE: ICD-10-CM

## 2023-10-12 RX ORDER — TRAZODONE HYDROCHLORIDE 50 MG/1
50 TABLET ORAL NIGHTLY PRN
Qty: 30 TABLET | Refills: 0 | Status: SHIPPED | OUTPATIENT
Start: 2023-10-12

## 2023-10-12 NOTE — TELEPHONE ENCOUNTER
Refill request for:    Requested Prescriptions     Pending Prescriptions Disp Refills    TRAZODONE 50 MG Oral Tab [Pharmacy Med Name: traZODone HCl Oral Tablet 50 MG] 30 tablet 0     Sig: Take 1 tablet by mouth nightly as needed for Sleep. Last Prescribed Quantity Refills   9/6/23 30 0     LOV 8/2/2023     Patient was asked to follow-up in:     Appointment due:     Appointment scheduled: Visit date not found    Medication not on protocol.      Routed [facility]

## 2023-11-20 ENCOUNTER — TELEPHONE (OUTPATIENT)
Dept: FAMILY MEDICINE CLINIC | Facility: CLINIC | Age: 28
End: 2023-11-20

## 2023-11-29 ENCOUNTER — OFFICE VISIT (OUTPATIENT)
Dept: FAMILY MEDICINE CLINIC | Facility: CLINIC | Age: 28
End: 2023-11-29
Payer: COMMERCIAL

## 2023-11-29 VITALS
SYSTOLIC BLOOD PRESSURE: 110 MMHG | BODY MASS INDEX: 26 KG/M2 | RESPIRATION RATE: 16 BRPM | WEIGHT: 179.19 LBS | TEMPERATURE: 98 F | DIASTOLIC BLOOD PRESSURE: 80 MMHG | HEART RATE: 78 BPM

## 2023-11-29 DIAGNOSIS — Z20.2 EXPOSURE TO CHLAMYDIA: Primary | ICD-10-CM

## 2023-11-29 DIAGNOSIS — F41.9 ANXIETY: ICD-10-CM

## 2023-11-29 DIAGNOSIS — N50.811 PAIN IN RIGHT TESTICLE: ICD-10-CM

## 2023-11-29 DIAGNOSIS — F90.2 ATTENTION DEFICIT HYPERACTIVITY DISORDER (ADHD), COMBINED TYPE: ICD-10-CM

## 2023-11-29 PROCEDURE — 99214 OFFICE O/P EST MOD 30 MIN: CPT | Performed by: PHYSICIAN ASSISTANT

## 2023-11-29 PROCEDURE — 90471 IMMUNIZATION ADMIN: CPT | Performed by: PHYSICIAN ASSISTANT

## 2023-11-29 PROCEDURE — 90686 IIV4 VACC NO PRSV 0.5 ML IM: CPT | Performed by: PHYSICIAN ASSISTANT

## 2023-11-29 PROCEDURE — 3074F SYST BP LT 130 MM HG: CPT | Performed by: PHYSICIAN ASSISTANT

## 2023-11-29 PROCEDURE — 3079F DIAST BP 80-89 MM HG: CPT | Performed by: PHYSICIAN ASSISTANT

## 2023-11-29 RX ORDER — DEXTROAMPHETAMINE SACCHARATE, AMPHETAMINE ASPARTATE MONOHYDRATE, DEXTROAMPHETAMINE SULFATE AND AMPHETAMINE SULFATE 7.5; 7.5; 7.5; 7.5 MG/1; MG/1; MG/1; MG/1
30 CAPSULE, EXTENDED RELEASE ORAL DAILY
Qty: 30 CAPSULE | Refills: 0 | Status: SHIPPED | OUTPATIENT
Start: 2023-12-30 | End: 2024-01-29

## 2023-11-29 RX ORDER — DEXTROAMPHETAMINE SACCHARATE, AMPHETAMINE ASPARTATE MONOHYDRATE, DEXTROAMPHETAMINE SULFATE AND AMPHETAMINE SULFATE 7.5; 7.5; 7.5; 7.5 MG/1; MG/1; MG/1; MG/1
30 CAPSULE, EXTENDED RELEASE ORAL DAILY
Qty: 30 CAPSULE | Refills: 0 | Status: SHIPPED | OUTPATIENT
Start: 2024-01-30 | End: 2024-02-29

## 2023-11-29 RX ORDER — DOXYCYCLINE HYCLATE 100 MG
100 TABLET ORAL 2 TIMES DAILY
Qty: 14 TABLET | Refills: 0 | Status: SHIPPED | OUTPATIENT
Start: 2023-11-29 | End: 2023-12-06

## 2023-11-29 RX ORDER — SILDENAFIL CITRATE 20 MG/1
20 TABLET ORAL
COMMUNITY
Start: 2023-09-13

## 2023-11-29 RX ORDER — DEXTROAMPHETAMINE SACCHARATE, AMPHETAMINE ASPARTATE MONOHYDRATE, DEXTROAMPHETAMINE SULFATE AND AMPHETAMINE SULFATE 7.5; 7.5; 7.5; 7.5 MG/1; MG/1; MG/1; MG/1
30 CAPSULE, EXTENDED RELEASE ORAL DAILY
Qty: 30 CAPSULE | Refills: 0 | Status: SHIPPED | OUTPATIENT
Start: 2023-11-29 | End: 2023-12-29

## 2023-11-30 LAB
CHLAMYDIA TRACHOMATIS$RNA, TMA: NOT DETECTED
NEISSERIA GONORRHOEAE$RNA, TMA: NOT DETECTED

## 2023-12-03 ENCOUNTER — HOSPITAL ENCOUNTER (OUTPATIENT)
Dept: ULTRASOUND IMAGING | Age: 28
Discharge: HOME | End: 2023-12-03
Attending: PHYSICIAN ASSISTANT
Payer: COMMERCIAL

## 2023-12-03 ENCOUNTER — HOSPITAL ENCOUNTER (OUTPATIENT)
Dept: ULTRASOUND IMAGING | Age: 28
End: 2023-12-03
Attending: PHYSICIAN ASSISTANT
Payer: COMMERCIAL

## 2023-12-03 DIAGNOSIS — N50.811 PAIN IN RIGHT TESTICLE: ICD-10-CM

## 2023-12-03 PROCEDURE — 93975 VASCULAR STUDY: CPT | Performed by: PHYSICIAN ASSISTANT

## 2023-12-03 PROCEDURE — 76870 US EXAM SCROTUM: CPT | Performed by: PHYSICIAN ASSISTANT

## 2023-12-19 ENCOUNTER — TELEMEDICINE (OUTPATIENT)
Dept: FAMILY MEDICINE CLINIC | Facility: CLINIC | Age: 28
End: 2023-12-19
Payer: COMMERCIAL

## 2023-12-19 DIAGNOSIS — Z01.84 IMMUNITY STATUS TESTING: Primary | ICD-10-CM

## 2023-12-26 NOTE — PROGRESS NOTES
Telemedicine Visit     Virtual Video Check-In    Eros Lucas verbally consents to Video Check-In service on 12/19/23. Eros Lucas understands and accepts financial responsibility for any deductible, co-insurance and/or co-pays associated with this service. This visit is conducted using Telemedicine with live, interactive video and audio. Eros Lucas states they are currently in the state of [place]. Chief Complaint: lab testing    HPI: Lynn Douglas is here today for required testing. 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 He did test positive for latent TB last year and completed treatment. No concerns today. Patient Active Problem List   Diagnosis    Attention deficit hyperactivity disorder (ADHD), combined type    Anxiety    Insomnia    Systolic murmur    AV block, 1st degree       Current Outpatient Medications   Medication Sig Dispense Refill    sildenafil 20 MG Oral Tab Take 1 tablet (20 mg total) by mouth. amphetamine-dextroamphetamine ER (ADDERALL XR) 30 MG Oral Capsule SR 24 Hr Take 1 capsule (30 mg total) by mouth daily. 30 capsule 0    [START ON 12/30/2023] amphetamine-dextroamphetamine ER (ADDERALL XR) 30 MG Oral Capsule SR 24 Hr Take 1 capsule (30 mg total) by mouth daily. 30 capsule 0    [START ON 1/30/2024] amphetamine-dextroamphetamine ER (ADDERALL XR) 30 MG Oral Capsule SR 24 Hr Take 1 capsule (30 mg total) by mouth daily. 30 capsule 0    TRAZODONE 50 MG Oral Tab Take 1 tablet by mouth nightly as needed for Sleep. 30 tablet 0    FLUoxetine 10 MG Oral Cap Take 1 capsule (10 mg total) by mouth daily. 90 capsule 0       Allergies  Allergies   Allergen Reactions    Bee Venom SWELLING     Severe swelling       Physical exam  Physical Exam  Constitutional:       Appearance: Normal appearance. Pulmonary:      Effort: Pulmonary effort is normal.   Neurological:      Mental Status: He is alert.    Psychiatric: Mood and Affect: Mood normal.         Behavior: Behavior normal.         Thought Content: Thought content normal.         Judgment: Judgment normal.          Summary of topics discussed/ diagnosis:  1. Immunity status testing  - Quantiferon TB Plus  - Measles Ab IGG,IGM [60232] [Q]  - MUMPS ANTIBODY PANEL (IGG + IGM) [88032] [Q]  - RUBELLA IGG&IGM AB [70307] [Q]  - VARICELLA ZOSTER, IGG [4439] [Q]  - Hepatitis B Surface Antibody [E]  Testing ordered. Hx of latent TB, fully treated, will address further if needed on testing results. Please note that the following visit was completed using two-way, real-time interactive audio and/or video communication. This has been done in good mahnaz to provide continuity of care in the best interest of the provider-patient relationship. There are limitations of this visit as no physical exam could be performed. Every conscious effort was taken to allow for sufficient and adequate time. This billing was spent on reviewing labs, medications, radiology tests and decision making. Appropriate medical decision-making and tests are ordered as detailed in the plan of care above. Jennifer Anthony understands video evaluation is not a substitute for in person examination or emergency care. Patient advised to go to ER or call 911 for worsening symptoms or acute distress.    MELVIN Dickson

## 2024-01-05 ENCOUNTER — PATIENT MESSAGE (OUTPATIENT)
Dept: FAMILY MEDICINE CLINIC | Facility: CLINIC | Age: 29
End: 2024-01-05

## 2024-01-05 DIAGNOSIS — R76.12 POSITIVE QUANTIFERON-TB GOLD TEST: Primary | ICD-10-CM

## 2024-01-05 LAB
HEPATITIS B SURFACE$ANTIBODY QL: REACTIVE
MEASLES AB (IGG), DIAGNOSTIC: <13.5 AU/ML
MITOGEN-NIL: >10 IU/ML
MUMPS VIRUS AB (IGG), DIAGNOSTIC: 20.1 AU/ML
NIL: 0.03 IU/ML
QUANTIFERON(R)-TB GOLD PLUS, 1 TUBE: POSITIVE
RUBELLA ANTIBODY (IGG) DIAGNOSTIC: 1.97 INDEX
RUBELLA ANTIBODY (IGM): 22.6 AU/ML
TB1-NIL: 0.34 IU/ML
TB2-NIL: 0.39 IU/ML
VARICELLA ZOSTER VIRUS$AB (IGG): 1190 INDEX

## 2024-01-05 NOTE — TELEPHONE ENCOUNTER
From: Gucci Feliciano  To: Stephon Whiteside  Sent: 1/5/2024 3:16 PM CST  Subject: Question regarding MUMPS ANTIBODY PANEL (IGG + IGM)    Hey, it looks like a few things came back negative for certain titers. And also the TB came back positive. What are the steps i should take promptly in order to get things passed through and all good for my chart review? Attached below is the chart review requirements.

## 2024-01-16 ENCOUNTER — HOSPITAL ENCOUNTER (OUTPATIENT)
Dept: GENERAL RADIOLOGY | Facility: HOSPITAL | Age: 29
Discharge: HOME | End: 2024-01-16
Attending: PHYSICIAN ASSISTANT
Payer: COMMERCIAL

## 2024-01-16 DIAGNOSIS — R76.12 POSITIVE QUANTIFERON-TB GOLD TEST: ICD-10-CM

## 2024-01-16 PROCEDURE — 71046 X-RAY EXAM CHEST 2 VIEWS: CPT | Performed by: PHYSICIAN ASSISTANT

## 2024-01-25 ENCOUNTER — OFFICE VISIT (OUTPATIENT)
Dept: FAMILY MEDICINE CLINIC | Facility: CLINIC | Age: 29
End: 2024-01-25
Payer: COMMERCIAL

## 2024-01-25 VITALS
HEART RATE: 68 BPM | RESPIRATION RATE: 12 BRPM | DIASTOLIC BLOOD PRESSURE: 80 MMHG | BODY MASS INDEX: 25.31 KG/M2 | HEIGHT: 69.5 IN | SYSTOLIC BLOOD PRESSURE: 124 MMHG | WEIGHT: 174.81 LBS

## 2024-01-25 DIAGNOSIS — B34.9 VIRAL ILLNESS: ICD-10-CM

## 2024-01-25 DIAGNOSIS — F90.2 ATTENTION DEFICIT HYPERACTIVITY DISORDER (ADHD), COMBINED TYPE: ICD-10-CM

## 2024-01-25 DIAGNOSIS — Z13.0 SCREENING FOR IRON DEFICIENCY ANEMIA: ICD-10-CM

## 2024-01-25 DIAGNOSIS — Z13.29 SCREENING FOR THYROID DISORDER: ICD-10-CM

## 2024-01-25 DIAGNOSIS — Z13.1 SCREENING FOR DIABETES MELLITUS: ICD-10-CM

## 2024-01-25 DIAGNOSIS — Z00.00 LABORATORY EXAMINATION ORDERED AS PART OF A ROUTINE GENERAL MEDICAL EXAMINATION: ICD-10-CM

## 2024-01-25 DIAGNOSIS — Z13.6 SCREENING FOR CARDIOVASCULAR CONDITION: ICD-10-CM

## 2024-01-25 DIAGNOSIS — Z00.00 ANNUAL PHYSICAL EXAM: Primary | ICD-10-CM

## 2024-01-25 PROCEDURE — 3079F DIAST BP 80-89 MM HG: CPT | Performed by: FAMILY MEDICINE

## 2024-01-25 PROCEDURE — 90471 IMMUNIZATION ADMIN: CPT | Performed by: FAMILY MEDICINE

## 2024-01-25 PROCEDURE — 3008F BODY MASS INDEX DOCD: CPT | Performed by: FAMILY MEDICINE

## 2024-01-25 PROCEDURE — 99395 PREV VISIT EST AGE 18-39: CPT | Performed by: FAMILY MEDICINE

## 2024-01-25 PROCEDURE — 90707 MMR VACCINE SC: CPT | Performed by: FAMILY MEDICINE

## 2024-01-25 PROCEDURE — 3074F SYST BP LT 130 MM HG: CPT | Performed by: FAMILY MEDICINE

## 2024-01-25 RX ORDER — DEXTROAMPHETAMINE SACCHARATE, AMPHETAMINE ASPARTATE MONOHYDRATE, DEXTROAMPHETAMINE SULFATE AND AMPHETAMINE SULFATE 7.5; 7.5; 7.5; 7.5 MG/1; MG/1; MG/1; MG/1
30 CAPSULE, EXTENDED RELEASE ORAL DAILY
Qty: 30 CAPSULE | Refills: 0 | Status: SHIPPED | OUTPATIENT
Start: 2024-03-30 | End: 2024-04-29

## 2024-01-25 RX ORDER — DEXTROAMPHETAMINE SACCHARATE, AMPHETAMINE ASPARTATE MONOHYDRATE, DEXTROAMPHETAMINE SULFATE AND AMPHETAMINE SULFATE 7.5; 7.5; 7.5; 7.5 MG/1; MG/1; MG/1; MG/1
30 CAPSULE, EXTENDED RELEASE ORAL DAILY
Qty: 30 CAPSULE | Refills: 0 | Status: SHIPPED | OUTPATIENT
Start: 2024-02-29 | End: 2024-03-30

## 2024-01-25 NOTE — PROGRESS NOTES
Gucci Feliciano is a 28 year old male who presents for a complete physical exam.     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   His last annual assessment has been over 1 year: Annual Physical Never done       Subjective:    He complains of doing well, going to scoll,   Stable in adhd meds, fluoxetine lead to emotional blunting. .     Tobacco:  He smoked tobacco in the past but quit greater than 12 months ago.  Social History    Tobacco Use      Smoking status: Former      Smokeless tobacco: Never      Tobacco comments: 4 cigarettes daily        Wt Readings from Last 4 Encounters:   01/25/24 174 lb 12.8 oz (79.3 kg)   11/29/23 179 lb 3.2 oz (81.3 kg)   08/02/23 182 lb 6.4 oz (82.7 kg)   05/09/23 170 lb (77.1 kg)     Body mass index is 25.44 kg/m².      Chief Complaint Reviewed and Verified  Nursing Notes Reviewed and   Verified  Tobacco Reviewed  Allergies Reviewed  Medications Reviewed    Problem List Reviewed  Medical History Reviewed  Surgical History   Reviewed  Family History Reviewed          His family history includes Alcohol and Other Disorders Associated in his mother; Breast Cancer 2nd occurrence in his mother; No Known Problems in his father and sister; lung cancer in his maternal grandfather.   He  reports that he has quit smoking. He has never used smokeless tobacco. He reports current alcohol use. He reports that he does not use drugs.    Exercise: three times per week.  Diet: doesn't watch    Health Maintenance Due   Topic Date Due    Annual Physical  Never done    COVID-19 Vaccine (2 - 2023-24 season) 09/01/2023    Annual Depression Screening  01/01/2024       STI risk:     Review of Systems   Constitutional: Negative.  Negative for activity change, appetite change, chills and fever.   HENT: Negative.     Eyes: Negative.    Respiratory: Negative.  Negative for shortness of breath.    Cardiovascular:  Negative.  Negative for chest pain and palpitations.   Gastrointestinal: Negative.  Negative for abdominal pain.   Genitourinary: Negative.  Negative for dysuria.   Musculoskeletal:  Negative for arthralgias.   Skin: Negative.  Negative for rash.   Allergic/Immunologic: Negative.    Neurological: Negative.         Results:    Lab Results   Component Value Date/Time    WBC 9.7 05/18/2012 10:13 PM    HGB 14.9 05/18/2012 10:13 PM    .0 05/18/2012 10:13 PM      Lab Results   Component Value Date/Time     (H) 05/19/2012 06:10 AM     05/19/2012 06:10 AM    K 4.0 05/19/2012 06:10 AM     05/19/2012 06:10 AM    CO2 23.0 05/19/2012 06:10 AM    CREATSERUM 1.2 (H) 05/19/2012 06:10 AM    CA 8.0 (L) 05/19/2012 06:10 AM    ALB 4.7 05/18/2012 10:13 PM    TP 7.7 05/18/2012 10:13 PM    ALKPHO 130 05/18/2012 10:13 PM    AST 47 (H) 05/18/2012 10:13 PM    ALT 37 05/18/2012 10:13 PM    BILT 0.7 05/18/2012 10:13 PM        No results found for: \"CHOLEST\", \"HDL\", \"TRIG\", \"LDL\", \"NONHDLC\"    Last A1c value was  % done  .     Vitamin D:     No results found for: \"VITD\"       Social History:  Social History     Socioeconomic History    Marital status: Single   Tobacco Use    Smoking status: Former    Smokeless tobacco: Never    Tobacco comments:     4 cigarettes daily   Vaping Use    Vaping Use: Some days   Substance and Sexual Activity    Alcohol use: Yes     Alcohol/week: 0.0 standard drinks of alcohol     Comment: 2 drinks a week    Drug use: No   Other Topics Concern    Caffeine Concern Yes     Comment: once a day    Exercise Yes     Comment: everyday    Seat Belt Yes      Exercise: three times per week.  Diet: watches minimally and watches fats closely     Objective:    EXAM:  /80   Pulse 68   Resp 12   Ht 5' 9.5\" (1.765 m)   Wt 174 lb 12.8 oz (79.3 kg)   BMI 25.44 kg/m²  Estimated body mass index is 25.44 kg/m² as calculated from the following:    Height as of this encounter: 5' 9.5\" (1.765 m).     Weight as of this encounter: 174 lb 12.8 oz (79.3 kg).   Physical Exam  Vitals and nursing note reviewed.   Constitutional:       General: He is not in acute distress.     Appearance: Normal appearance.   HENT:      Head: Normocephalic and atraumatic.      Right Ear: Tympanic membrane and external ear normal.      Left Ear: Tympanic membrane and external ear normal.      Nose: Nose normal.      Mouth/Throat:      Mouth: Mucous membranes are moist.   Eyes:      Extraocular Movements: Extraocular movements intact.      Pupils: Pupils are equal, round, and reactive to light.   Cardiovascular:      Rate and Rhythm: Normal rate and regular rhythm.      Pulses: Normal pulses.           Carotid pulses are 2+ on the right side and 2+ on the left side.       Radial pulses are 2+ on the right side and 2+ on the left side.        Dorsalis pedis pulses are 2+ on the right side and 2+ on the left side.        Posterior tibial pulses are 2+ on the right side and 2+ on the left side.      Heart sounds: Normal heart sounds, S1 normal and S2 normal. No murmur heard.  Pulmonary:      Effort: Pulmonary effort is normal.      Breath sounds: Normal breath sounds.   Abdominal:      General: Abdomen is flat. Bowel sounds are normal. There is no distension.      Palpations: Abdomen is soft.   Musculoskeletal:         General: Normal range of motion.      Cervical back: Normal range of motion and neck supple.      Right lower leg: No edema.      Left lower leg: No edema.   Skin:     General: Skin is warm and dry.      Capillary Refill: Capillary refill takes less than 2 seconds.   Neurological:      General: No focal deficit present.      Mental Status: He is alert and oriented to person, place, and time.   Psychiatric:         Mood and Affect: Mood normal.         Behavior: Behavior normal.         Thought Content: Thought content normal.          Assessment & Plan:    Gucci Feliciano is a 28 year old male who presents for a complete  physical exam.   Pt's weight is Body mass index is 25.44 kg/m²., recommended low fat diet and aerobic exercise 30 minutes three times weekly.   Health maintenance, Up to date    Immunizations: Up to date   Immunization History   Administered Date(s) Administered    Covid-19 Vaccine Pfizer 30 mcg/0.3 ml 07/12/2021    DT 10/04/2008    DTAP 02/21/1996, 04/25/1996, 06/12/1996    DTAP INFANRIX 08/22/2001    DTP 02/21/1996, 04/25/1996, 06/12/1996    DTP/HIB Combined 02/21/1996    FLULAVAL 6 months & older 0.5 ml Prefilled syringe (32865) 09/23/2019, 10/06/2020    FLUZONE 6 months and older PFS 0.5 ml (85412) 11/29/2023    HEP B 12/27/1995, 02/21/1996, 09/30/1996, 10/22/2020, 11/24/2020, 04/06/2021    HEP B, Adult 10/22/2020, 11/24/2020, 04/06/2021    HEP B, Ped/Adol 12/26/1995, 02/21/1996, 09/30/1996    IPV 02/21/1996, 04/25/1996, 06/12/1996, 08/22/2001, 08/22/2001    MMR 02/24/1997, 08/22/2001, 10/22/2020, 11/24/2020, 01/25/2024    Meningococcal-Menactra 07/29/2010, 07/30/2014    OPV 02/21/1996, 04/25/1996, 06/12/1996    TDAP 07/29/2010, 10/06/2020    Tb Intradermal Test 09/30/1996    Varicella 02/24/1997, 07/29/2010   Pended Date(s) Pended    MMR 01/16/2024         Pt info given for: exercise, low fat diet, The patient indicates understanding of these issues and agrees to the plan.  The patient is asked to return for CPX in 1 years.    Assessment:  1. Annual physical exam (Primary)  2. Viral illness  -     Rubeola(Measles)Antibodies, IGG-Immunity  3. Attention deficit hyperactivity disorder (ADHD), combined type  Overview:  Wellbutrin 300 started 3/2023, previously on Adderall ER 30 but stopped in 2022  Orders:  -     Amphetamine-Dextroamphet ER; Take 1 capsule (30 mg total) by mouth daily.  Dispense: 30 capsule; Refill: 0  -     Amphetamine-Dextroamphet ER; Take 1 capsule (30 mg total) by mouth daily.  Dispense: 30 capsule; Refill: 0  4. Screening for diabetes mellitus  -     Comp Metabolic Panel (14)  5. Screening for  iron deficiency anemia  -     CBC With Differential With Platelet  6. Screening for thyroid disorder  -     TSH W Reflex To Free T4  7. Screening for cardiovascular condition  -     Lipid Panel  8. Laboratory examination ordered as part of a routine general medical examination  -     TSH W Reflex To Free T4  -     Lipid Panel  -     CBC With Differential With Platelet  -     Comp Metabolic Panel (14)  Other orders  -     MMR [04228]   Stable, continue present management      I have discontinued Colton DAVIESHilda Braden's FLUoxetine. I am also having him maintain his traZODone, sildenafil, amphetamine-dextroamphetamine ER, amphetamine-dextroamphetamine ER, and amphetamine-dextroamphetamine ER.   Return in about 3 months (around 4/25/2024) for recheck ADHD.

## 2024-01-30 ENCOUNTER — APPOINTMENT (OUTPATIENT)
Dept: OTHER | Facility: HOSPITAL | Age: 29
End: 2024-01-30
Attending: PREVENTIVE MEDICINE

## 2024-02-21 LAB — MEASLES ANTIBODY (IGG): <13.5 AU/ML

## 2024-03-03 DIAGNOSIS — F90.2 ATTENTION DEFICIT HYPERACTIVITY DISORDER (ADHD), COMBINED TYPE: ICD-10-CM

## 2024-03-05 NOTE — TELEPHONE ENCOUNTER
Refill request for:    Requested Prescriptions     Pending Prescriptions Disp Refills    TRAZODONE 50 MG Oral Tab [Pharmacy Med Name: traZODone HCl Oral Tablet 50 MG] 30 tablet 0     Sig: TAKE 1 TABLET BY MOUTH nightly as needed for sleep        Last Prescribed Quantity Refills   10/12/2023 30 0     LOV 1/25/2024     Patient was asked to follow-up in:     Appointment due:     Appointment scheduled: Visit date not found    Medication not on protocol.     Routed to [facility]

## 2024-03-11 RX ORDER — TRAZODONE HYDROCHLORIDE 50 MG/1
50 TABLET ORAL NIGHTLY PRN
Qty: 30 TABLET | Refills: 11 | Status: SHIPPED | OUTPATIENT
Start: 2024-03-11

## 2024-03-11 NOTE — TELEPHONE ENCOUNTER
Refill request for:    Requested Prescriptions     Pending Prescriptions Disp Refills    TRAZODONE 50 MG Oral Tab [Pharmacy Med Name: traZODone HCl Oral Tablet 50 MG] 30 tablet 0     Sig: TAKE 1 TABLET BY MOUTH nightly as needed for sleep        Last Prescribed Quantity Refills   10/12/23 30 0     LOV 1/25/2024     Patient was asked to follow-up in: 3 months for ADHD follow up    Appointment due: April 2024    Appointment scheduled: Visit date not found    Medication not on protocol.     Routed to Dr Vega.

## 2024-04-04 DIAGNOSIS — F90.2 ATTENTION DEFICIT HYPERACTIVITY DISORDER (ADHD), COMBINED TYPE: ICD-10-CM

## 2024-04-05 RX ORDER — TRAZODONE HYDROCHLORIDE 50 MG/1
50 TABLET ORAL NIGHTLY PRN
Qty: 30 TABLET | Refills: 4 | Status: SHIPPED | OUTPATIENT
Start: 2024-04-05

## 2024-04-05 NOTE — TELEPHONE ENCOUNTER
Refill request for:    Requested Prescriptions     Pending Prescriptions Disp Refills    TRAZODONE 50 MG Oral Tab [Pharmacy Med Name: traZODone HCl Oral Tablet 50 MG] 30 tablet 0     Sig: TAKE 1 TABLET BY MOUTH nightly as needed for sleep        Last Prescribed Quantity Refills   3/11/24 30 11     LOV 1/25/2024     Patient was asked to follow-up in: 3 months    Appointment due: April 2024    Appointment scheduled: Visit date not found    Medication not on protocol.     Routed to Dr eVga

## 2024-07-19 ENCOUNTER — PATIENT MESSAGE (OUTPATIENT)
Dept: FAMILY MEDICINE CLINIC | Facility: CLINIC | Age: 29
End: 2024-07-19

## 2024-07-19 DIAGNOSIS — F90.2 ATTENTION DEFICIT HYPERACTIVITY DISORDER (ADHD), COMBINED TYPE: ICD-10-CM

## 2024-07-19 NOTE — TELEPHONE ENCOUNTER
From: Gucci Feliciano  To: Stephon Whiteside  Sent: 7/19/2024 11:22 AM CDT  Subject: Adderall renewal appointment    I can not find a date before my next adderall prescription is due. I believe it needs to be ready by august 6th and there are no appointments available before then. I have updated medicaid insurance as well. Is there any way to schedule an appointment before then or to renew the prescription with out the appointment check in?

## 2024-07-20 NOTE — TELEPHONE ENCOUNTER
Refill request for:    Requested Prescriptions     Pending Prescriptions Disp Refills    traZODone 50 MG Oral Tab 30 tablet 4     Sig: Take 1 tablet (50 mg total) by mouth nightly as needed for Sleep.    amphetamine-dextroamphetamine ER (ADDERALL XR) 30 MG Oral Capsule SR 24 Hr 30 capsule 0     Sig: Take 1 capsule (30 mg total) by mouth daily.        Last Prescribed Quantity Refills   Trazodone  4/5/24   30   4   Adderall  7/01/24   30   0     LOV 4/30/2024     Patient was asked to follow-up in: Follow-up not documented in note    Appointment scheduled: 8/23/2024 Sergio Vega MD    Medication not on protocol.     Adderall:    Patient comment: I can not get in for another appointment in time for my next adderall prescription. It should be due august 6th but there are no appointments available before then     Routed to Silvia Hill MD, for review

## 2024-07-20 NOTE — TELEPHONE ENCOUNTER
Appt made     8/23/2024  10:15 am Dr. Silvia Rolon renewal .    Request a medication refill      [id number] - [place], [place] - [address] [phone], [phone]   [address] [facility] [address]   Phone: [phone] Fax: [phone]   Hours: Not open 24 hours

## 2024-07-24 RX ORDER — DEXTROAMPHETAMINE SACCHARATE, AMPHETAMINE ASPARTATE MONOHYDRATE, DEXTROAMPHETAMINE SULFATE AND AMPHETAMINE SULFATE 7.5; 7.5; 7.5; 7.5 MG/1; MG/1; MG/1; MG/1
30 CAPSULE, EXTENDED RELEASE ORAL DAILY
Qty: 30 CAPSULE | Refills: 0 | Status: SHIPPED | OUTPATIENT
Start: 2024-07-24 | End: 2024-08-23

## 2024-07-24 RX ORDER — DEXTROAMPHETAMINE SACCHARATE, AMPHETAMINE ASPARTATE MONOHYDRATE, DEXTROAMPHETAMINE SULFATE AND AMPHETAMINE SULFATE 7.5; 7.5; 7.5; 7.5 MG/1; MG/1; MG/1; MG/1
30 CAPSULE, EXTENDED RELEASE ORAL DAILY
Qty: 30 CAPSULE | Refills: 0 | OUTPATIENT
Start: 2024-07-24 | End: 2024-08-23

## 2024-07-24 RX ORDER — TRAZODONE HYDROCHLORIDE 50 MG/1
50 TABLET ORAL NIGHTLY PRN
Qty: 30 TABLET | Refills: 4 | Status: SHIPPED | OUTPATIENT
Start: 2024-07-24

## 2025-01-10 RX ORDER — FLUOXETINE 10 MG/1
10 CAPSULE ORAL DAILY
Qty: 90 CAPSULE | Refills: 3 | OUTPATIENT
Start: 2025-01-10

## 2025-02-13 ENCOUNTER — HOSPITAL ENCOUNTER (EMERGENCY)
Facility: HOSPITAL | Age: 30
Discharge: HOME | End: 2025-02-13
Attending: EMERGENCY MEDICINE
Payer: MEDICAID

## 2025-02-13 VITALS
RESPIRATION RATE: 16 BRPM | DIASTOLIC BLOOD PRESSURE: 72 MMHG | TEMPERATURE: 98 F | HEART RATE: 91 BPM | SYSTOLIC BLOOD PRESSURE: 129 MMHG | OXYGEN SATURATION: 100 %

## 2025-02-13 DIAGNOSIS — R19.7 NAUSEA VOMITING AND DIARRHEA: Primary | ICD-10-CM

## 2025-02-13 DIAGNOSIS — R11.2 NAUSEA VOMITING AND DIARRHEA: Primary | ICD-10-CM

## 2025-02-13 LAB
ALBUMIN SERPL-MCNC: 4.8 G/DL (ref 3.2–4.8)
ALBUMIN/GLOB SERPL: 1.6 {RATIO} (ref 1–2)
ALP LIVER SERPL-CCNC: 56 U/L
ALT SERPL-CCNC: 45 U/L
ANION GAP SERPL CALC-SCNC: 13 MMOL/L (ref 0–18)
AST SERPL-CCNC: 52 U/L (ref ?–34)
BASOPHILS # BLD AUTO: 0.03 X10(3) UL (ref 0–0.2)
BASOPHILS NFR BLD AUTO: 0.2 %
BILIRUB SERPL-MCNC: 1.1 MG/DL (ref 0.3–1.2)
BUN BLD-MCNC: 21 MG/DL (ref 9–23)
CALCIUM BLD-MCNC: 9.9 MG/DL (ref 8.7–10.6)
CHLORIDE SERPL-SCNC: 102 MMOL/L (ref 98–112)
CO2 SERPL-SCNC: 22 MMOL/L (ref 21–32)
CREAT BLD-MCNC: 1.69 MG/DL
EGFRCR SERPLBLD CKD-EPI 2021: 56 ML/MIN/1.73M2 (ref 60–?)
EOSINOPHIL # BLD AUTO: 0.06 X10(3) UL (ref 0–0.7)
EOSINOPHIL NFR BLD AUTO: 0.4 %
ERYTHROCYTE [DISTWIDTH] IN BLOOD BY AUTOMATED COUNT: 11.9 %
GLOBULIN PLAS-MCNC: 3 G/DL (ref 2–3.5)
GLUCOSE BLD-MCNC: 170 MG/DL (ref 70–99)
HCT VFR BLD AUTO: 44.8 %
HGB BLD-MCNC: 16.6 G/DL
IMM GRANULOCYTES # BLD AUTO: 0.06 X10(3) UL (ref 0–1)
IMM GRANULOCYTES NFR BLD: 0.4 %
LYMPHOCYTES # BLD AUTO: 0.57 X10(3) UL (ref 1–4)
LYMPHOCYTES NFR BLD AUTO: 4.1 %
MCH RBC QN AUTO: 34.7 PG (ref 26–34)
MCHC RBC AUTO-ENTMCNC: 37.1 G/DL (ref 31–37)
MCV RBC AUTO: 93.5 FL
MONOCYTES # BLD AUTO: 1.35 X10(3) UL (ref 0.1–1)
MONOCYTES NFR BLD AUTO: 9.7 %
NEUTROPHILS # BLD AUTO: 11.91 X10 (3) UL (ref 1.5–7.7)
NEUTROPHILS # BLD AUTO: 11.91 X10(3) UL (ref 1.5–7.7)
NEUTROPHILS NFR BLD AUTO: 85.2 %
OSMOLALITY SERPL CALC.SUM OF ELEC: 291 MOSM/KG (ref 275–295)
PLATELET # BLD AUTO: 192 10(3)UL (ref 150–450)
POTASSIUM SERPL-SCNC: 3.9 MMOL/L (ref 3.5–5.1)
PROT SERPL-MCNC: 7.8 G/DL (ref 5.7–8.2)
RBC # BLD AUTO: 4.79 X10(6)UL
SODIUM SERPL-SCNC: 137 MMOL/L (ref 136–145)
WBC # BLD AUTO: 14 X10(3) UL (ref 4–11)

## 2025-02-13 PROCEDURE — 96361 HYDRATE IV INFUSION ADD-ON: CPT

## 2025-02-13 PROCEDURE — 80053 COMPREHEN METABOLIC PANEL: CPT | Performed by: EMERGENCY MEDICINE

## 2025-02-13 PROCEDURE — 85025 COMPLETE CBC W/AUTO DIFF WBC: CPT | Performed by: EMERGENCY MEDICINE

## 2025-02-13 PROCEDURE — 99284 EMERGENCY DEPT VISIT MOD MDM: CPT

## 2025-02-13 PROCEDURE — 96374 THER/PROPH/DIAG INJ IV PUSH: CPT

## 2025-02-13 RX ORDER — ONDANSETRON 2 MG/ML
4 INJECTION INTRAMUSCULAR; INTRAVENOUS ONCE
Status: COMPLETED | OUTPATIENT
Start: 2025-02-13 | End: 2025-02-13

## 2025-02-13 RX ORDER — ONDANSETRON 4 MG/1
4 TABLET, ORALLY DISINTEGRATING ORAL EVERY 4 HOURS PRN
Qty: 10 TABLET | Refills: 0 | Status: SHIPPED | OUTPATIENT
Start: 2025-02-13 | End: 2025-02-20

## 2025-02-13 NOTE — ED PROVIDER NOTES
Patient Seen in: [facility] Emergency Department      History     Chief Complaint   Patient presents with    Nausea/Vomiting/Diarrhea     Stated Complaint: Pt comes complaining of N/V/D+, started around 2100 yesterday. Denies any fever*    Subjective:   HPI      29-year-old male presenting with bloody diarrhea.  Patient had acute onset of nausea vomiting that was nonbloody emesis and diarrhea this evening.  Patient denies any other exacerbating factors or associated symptoms no history of surgeries on his abdomen no family history of bowel disease no other exacerbating relieving factors or associated symptoms    Objective:     Past Medical History:    Acne    ADHD (attention deficit hyperactivity disorder)    Toxic effect of venom(989.5)              History reviewed. No pertinent surgical history.             Social History     Socioeconomic History    Marital status: Single   Tobacco Use    Smoking status: Former    Smokeless tobacco: Never    Tobacco comments:     4 cigarettes daily   Vaping Use    Vaping status: Every Day    Substances: Nicotine    Devices: Pre-filled or refillable cartridge   Substance and Sexual Activity    Alcohol use: Yes     Comment: <2 drinks a week    Drug use: No   Other Topics Concern    Caffeine Concern Yes     Comment: once a day    Exercise Yes     Comment: everyday    Seat Belt Yes                  Physical Exam     ED Triage Vitals [02/13/25 0334]   /84   Pulse 83   Resp 16   Temp 98.4 °F (36.9 °C)   Temp src Oral   SpO2 100 %   O2 Device None (Room air)       Current Vitals:   Vital Signs  BP: 156/84  Pulse: 83  Resp: 16  Temp: 98.4 °F (36.9 °C)  Temp src: Oral    Oxygen Therapy  SpO2: 100 %  O2 Device: None (Room air)        Physical Exam  Awake alert patient appears no distress HEENT exam is normal lungs are clear cardiovascular exam shows a regular rhythm abdomen soft nontender extremities no COVID cyanosis or edema skin is not diaphoretic    ED Course     Labs  Reviewed   COMP METABOLIC PANEL (14) - Abnormal; Notable for the following components:       Result Value    Glucose 170 (*)     Creatinine 1.69 (*)     eGFR-Cr 56 (*)     AST 52 (*)     All other components within normal limits   CBC WITH DIFFERENTIAL WITH PLATELET - Abnormal; Notable for the following components:    WBC 14.0 (*)     MCH 34.7 (*)     MCHC 37.1 (*)     Neutrophil Absolute Prelim 11.91 (*)     Neutrophil Absolute 11.91 (*)     Lymphocyte Absolute 0.57 (*)     Monocyte Absolute 1.35 (*)     All other components within normal limits            Differential diagnosis includes acute renal failure diarrhea       MDM              Medical Decision Making  29-year-old male presenting to the emergency department for vomiting diarrhea IV established cardiac monitor shows a sinus rhythm pulse ox shows no signs of hypoxia CBC shows an elevated white cell metabolic panel shows slight renal sufficiency patient was given IV fluids antiemetic medication no further vomiting diarrhea emerged part will be discharged home is to return to the emergency department for worsening symptoms other complaints.  The patient was screened and evaluated during this visit.  As a treating physician attending to the patient, I determined, within reasonable clinical confidence and prior to discharge, that an emergency medical condition was not or was no longer present.  There was no indication for further evaluation, treatment or admission on an emergency basis.    The usual and customary discharge instructions were discussed given the patient's ER course.  We discussed signs and symptoms that should prompt the patient's immediate return to the emergency department.  Reasonable over-the-counter and prescription treatment options and physician follow-up plan was discussed.  Patient was discharged home in good condition  This note was prepared using Dragon Medical voice recognition dictation software.  As a result errors may occur.  When  identified to these areas have been corrected.  While every attempt is made to correct errors during dictation discrepancies may still exist.  Please contact if there are any errors    Problems Addressed:  Nausea vomiting and diarrhea: acute illness or injury    Amount and/or Complexity of Data Reviewed  Labs: ordered. Decision-making details documented in ED Course.  ECG/medicine tests: ordered and independent interpretation performed. Decision-making details documented in ED Course.        Disposition and Plan     Clinical Impression:  1. Nausea vomiting and diarrhea         Disposition:  There is no disposition on file for this visit.  There is no disposition time on file for this visit.    Follow-up:  No follow-up provider specified.        Medications Prescribed:  Current Discharge Medication List        START taking these medications    Details   ondansetron 4 MG Oral Tablet Dispersible Take 1 tablet (4 mg total) by mouth every 4 (four) hours as needed for Nausea.  Qty: 10 tablet, Refills: 0                 Supplementary Documentation:

## (undated) DIAGNOSIS — F90.2 ATTENTION DEFICIT HYPERACTIVITY DISORDER (ADHD), COMBINED TYPE: ICD-10-CM

## (undated) NOTE — LETTER
[facility]  Stimulant Agreement    The purpose of this Agreement is to prevent misunderstandings about certain medications you will be taking. This is to help you and your doctor to comply with the law regarding controlled pharmaceuticals.  You mu provider after  the date of    this agreement. Use of alcohol is contraindicated; if I drink alcohol, I promise to limit my    consumption to 1-2 drinks and to not drive or operate machinery. _______ I will not share my medication with anyone.     ______ him/her, states that he/she understands this information and has no further questions.

## (undated) NOTE — ED AVS SNAPSHOT
[place]   MRN: [id number]    Department:  [facility] Emergency Department   Date of Visit:  5/30/2019           Disclosure     Insurance plans vary and the physician(s) referred by the ER may not be covered by your plan.  Please contact your tell this physician (or your personal doctor if your instructions are to return to your personal doctor) about any new or lasting problems. The primary care or specialist physician will see patients referred from the [facility] Emergency Department.  Salvadore Skiff